# Patient Record
Sex: FEMALE | Race: WHITE | NOT HISPANIC OR LATINO
[De-identification: names, ages, dates, MRNs, and addresses within clinical notes are randomized per-mention and may not be internally consistent; named-entity substitution may affect disease eponyms.]

---

## 2023-01-19 ENCOUNTER — LABORATORY RESULT (OUTPATIENT)
Age: 28
End: 2023-01-19

## 2023-01-19 ENCOUNTER — OUTPATIENT (OUTPATIENT)
Dept: OUTPATIENT SERVICES | Facility: HOSPITAL | Age: 28
LOS: 1 days | End: 2023-01-19

## 2023-01-19 ENCOUNTER — APPOINTMENT (OUTPATIENT)
Dept: INTERNAL MEDICINE | Facility: CLINIC | Age: 28
End: 2023-01-19
Payer: MEDICAID

## 2023-01-19 VITALS
HEART RATE: 94 BPM | DIASTOLIC BLOOD PRESSURE: 74 MMHG | WEIGHT: 117 LBS | SYSTOLIC BLOOD PRESSURE: 115 MMHG | BODY MASS INDEX: 17.73 KG/M2 | OXYGEN SATURATION: 98 % | HEIGHT: 68 IN

## 2023-01-19 DIAGNOSIS — Z78.9 OTHER SPECIFIED HEALTH STATUS: ICD-10-CM

## 2023-01-19 DIAGNOSIS — Z00.00 ENCOUNTER FOR GENERAL ADULT MEDICAL EXAMINATION W/OUT ABNORMAL FINDINGS: ICD-10-CM

## 2023-01-19 DIAGNOSIS — E07.9 DISORDER OF THYROID, UNSPECIFIED: ICD-10-CM

## 2023-01-19 DIAGNOSIS — Z87.891 PERSONAL HISTORY OF NICOTINE DEPENDENCE: ICD-10-CM

## 2023-01-19 DIAGNOSIS — Z80.3 FAMILY HISTORY OF MALIGNANT NEOPLASM OF BREAST: ICD-10-CM

## 2023-01-19 DIAGNOSIS — E04.1 NONTOXIC SINGLE THYROID NODULE: ICD-10-CM

## 2023-01-19 DIAGNOSIS — R22.1 LOCALIZED SWELLING, MASS AND LUMP, NECK: ICD-10-CM

## 2023-01-19 DIAGNOSIS — Z86.19 PERSONAL HISTORY OF OTHER INFECTIOUS AND PARASITIC DISEASES: ICD-10-CM

## 2023-01-19 DIAGNOSIS — Z83.49 FAMILY HISTORY OF OTHER ENDOCRINE, NUTRITIONAL AND METABOLIC DISEASES: ICD-10-CM

## 2023-01-19 DIAGNOSIS — L70.9 ACNE, UNSPECIFIED: ICD-10-CM

## 2023-01-19 DIAGNOSIS — Z83.3 FAMILY HISTORY OF DIABETES MELLITUS: ICD-10-CM

## 2023-01-19 DIAGNOSIS — K92.1 MELENA: ICD-10-CM

## 2023-01-19 DIAGNOSIS — D50.9 IRON DEFICIENCY ANEMIA, UNSPECIFIED: ICD-10-CM

## 2023-01-19 PROCEDURE — 99204 OFFICE O/P NEW MOD 45 MIN: CPT

## 2023-01-19 NOTE — HEALTH RISK ASSESSMENT
[Patient reported PAP Smear was normal] : Patient reported PAP Smear was normal [HIV Test offered] : HIV Test offered [Hepatitis C test offered] : Hepatitis C test offered [Fully functional (bathing, dressing, toileting, transferring, walking, feeding)] : Fully functional (bathing, dressing, toileting, transferring, walking, feeding) [Fully functional (using the telephone, shopping, preparing meals, housekeeping, doing laundry, using] : Fully functional and needs no help or supervision to perform IADLs (using the telephone, shopping, preparing meals, housekeeping, doing laundry, using transportation, managing medications and managing finances) [Former] : Former [0-4] : 0-4 [Yes] : Yes [Monthly or less (1 pt)] : Monthly or less (1 point) [1 or 2 (0 pts)] : 1 or 2 (0 points) [Never (0 pts)] : Never (0 points) [de-identified] : - exercise every day  [de-identified] : Does not eat meat, dairy, eggs- eats fish, whole grain, sweet potato, tofu.  [PapSmearDate] : 11/21 [PapSmearComments] : Reports normal- did HPV co-testing

## 2023-01-19 NOTE — HISTORY OF PRESENT ILLNESS
[FreeTextEntry1] : establish care  [de-identified] : 27  year old F with thyroid disease, thyroid nodule, DEREK, H pylori infx coming in to establish care. \par \par Patient was diagnosed with Hashimoto's disease at age 7. From  [age] yrs old took levothyroxine, then med was stopped. Was restarted last year by an endocrinologist. Pt also reports hx of thyroid nodule- had an US for this but never followed up and does not result.  Denies tremors, sweating, diarrhea/constipation, skin/hair changes\par \par Pt reports having reflux/nausea for the past 2 months. Caffeine/dairy/acidic fruits make it worse. Denies trouble swallowing, sometimes gets epigastric abdominal pain. Had hx of H pylori that was treated when pt was 19 but never had confirmed eradication. Pt reports having endoscopy that showed inflammation- denies hx of ulcer or premalignant changes. Was told she did not need any repeat testing. Pt denies cough, trouble swallowing or pain when swallowing. \par \par Pt endorses- having episodes of blood around the stool-  not sure if intermixed w stool. Happens sometimes. Pt denies constipation or hx of hemorrhoids.Bleeding is on and off. No rectal pain or discomfort. Happened for years. Denies melena. Has had intentional weight loss after her divorce- 15lbs over 2 yrs. Always thin, always had hard time gaining wt. Denies fevers/chills/ night sweats. \par \par Patient reports hx if DEREK- periods last 5-6 days. Heaviest on 1st to 2nd day- Uses 4 super tampons on the heaviest days. Then periods taper off. Pt reports having worse periods with ParaGard. Not using birth control. Patient sexually active- use condoms.\par \par \par Patient is endorsing swollen neck lymph nodes R side>L. Under chin and right below the chin. Noticed them this week. Pt had COVID a month ago. No recent infection but does complain of mild sore throat the morning and some post nasal drip. \par \par Pt reports getting acne- it is on and off. Interested in following with a dermatologist.

## 2023-01-19 NOTE — PLAN
[FreeTextEntry1] : \par 27  year old F with thyroid disease, thyroid nodule, DEREK, H pylori infx coming in to establish care. \par \par #hx of DEREK\par - will obtain CBC with iron studies, B12, folate, ttg\par \par # hematochezia/ unintentional wt loss/ hx of H pylori infx/Reflux\par - discussed reflux diet \par - could be in the setting of hemorrhoids, but pt denies constipation and endorses unintentional wt loss \par - will refer to GI\par - lab as above, get H pylori stool antigen \par \par #thyroid nodule/ small neck nodule\par - will obtain neck and thyroid US\par \par #thyroid disease\par - will obtain TSH, FT4, T3, TPO ab\par - on levothyroxine 50cmg\par \par #sore throat/ postnasal drip\par -discussed conservative measures, can try getting humidifier, steam baths\par -not interested in tx for this at this time \par \par #acne\par -derm referral\par \par #HCM\par -labs as above, STI testing\par -decline immunizations \par - reports pap done in 2021, interested in GYN consultation\par -dental referral \par -advised to provide medical records\par \par f/u in 3 mo

## 2023-01-19 NOTE — PHYSICAL EXAM
[No Acute Distress] : no acute distress [Normal Oropharynx] : the oropharynx was normal [Supple] : supple [Thyroid Normal, No Nodules] : the thyroid was normal and there were no nodules present [No Respiratory Distress] : no respiratory distress  [No Accessory Muscle Use] : no accessory muscle use [Clear to Auscultation] : lungs were clear to auscultation bilaterally [Normal Rate] : normal rate  [Regular Rhythm] : with a regular rhythm [Normal S1, S2] : normal S1 and S2 [No Edema] : there was no peripheral edema [Soft] : abdomen soft [Non Tender] : non-tender [Non-distended] : non-distended [Normal Bowel Sounds] : normal bowel sounds [No Spinal Tenderness] : no spinal tenderness [Grossly Normal Strength/Tone] : grossly normal strength/tone [No Focal Deficits] : no focal deficits [Normal Affect] : the affect was normal [Normal Insight/Judgement] : insight and judgment were intact [Normal Sphincter Tone] : normal sphincter tone [de-identified] : Has small mobile rounded mass on R side of neck below chin. Non tender, cannot appreciate other masses on exam  [FreeTextEntry1] : has evidence of external hemorrhoids on exam, no internal hemorroid. No blood appreciated.

## 2023-01-20 ENCOUNTER — NON-APPOINTMENT (OUTPATIENT)
Age: 28
End: 2023-01-20

## 2023-01-20 DIAGNOSIS — Z00.00 ENCOUNTER FOR GENERAL ADULT MEDICAL EXAMINATION WITHOUT ABNORMAL FINDINGS: ICD-10-CM

## 2023-01-20 DIAGNOSIS — L70.9 ACNE, UNSPECIFIED: ICD-10-CM

## 2023-01-20 DIAGNOSIS — D50.9 IRON DEFICIENCY ANEMIA, UNSPECIFIED: ICD-10-CM

## 2023-01-20 DIAGNOSIS — Z86.19 PERSONAL HISTORY OF OTHER INFECTIOUS AND PARASITIC DISEASES: ICD-10-CM

## 2023-01-20 DIAGNOSIS — E07.9 DISORDER OF THYROID, UNSPECIFIED: ICD-10-CM

## 2023-01-20 DIAGNOSIS — R22.1 LOCALIZED SWELLING, MASS AND LUMP, NECK: ICD-10-CM

## 2023-01-20 DIAGNOSIS — K92.1 MELENA: ICD-10-CM

## 2023-01-20 DIAGNOSIS — E04.1 NONTOXIC SINGLE THYROID NODULE: ICD-10-CM

## 2023-01-20 LAB
25(OH)D3 SERPL-MCNC: 43.1 NG/ML
ALBUMIN SERPL ELPH-MCNC: 5.3 G/DL
ALP BLD-CCNC: 58 U/L
ALT SERPL-CCNC: 12 U/L
ANION GAP SERPL CALC-SCNC: 11 MMOL/L
AST SERPL-CCNC: 19 U/L
BASOPHILS # BLD AUTO: 0.07 K/UL
BASOPHILS NFR BLD AUTO: 1.7 %
BILIRUB SERPL-MCNC: 0.3 MG/DL
BUN SERPL-MCNC: 7 MG/DL
C TRACH RRNA SPEC QL NAA+PROBE: NOT DETECTED
CALCIUM SERPL-MCNC: 9.5 MG/DL
CHLORIDE SERPL-SCNC: 101 MMOL/L
CHOLEST SERPL-MCNC: 216 MG/DL
CO2 SERPL-SCNC: 28 MMOL/L
CREAT SERPL-MCNC: 0.58 MG/DL
EGFR: 126 ML/MIN/1.73M2
EOSINOPHIL # BLD AUTO: 0.1 K/UL
EOSINOPHIL NFR BLD AUTO: 2.4 %
ESTIMATED AVERAGE GLUCOSE: 100 MG/DL
FERRITIN SERPL-MCNC: 11 NG/ML
FOLATE SERPL-MCNC: 9.9 NG/ML
GLUCOSE SERPL-MCNC: 100 MG/DL
HBA1C MFR BLD HPLC: 5.1 %
HBV CORE IGG+IGM SER QL: NONREACTIVE
HBV SURFACE AB SER QL: REACTIVE
HBV SURFACE AG SER QL: NONREACTIVE
HCT VFR BLD CALC: 39.8 %
HCV AB SER QL: NONREACTIVE
HCV S/CO RATIO: 0.1 S/CO
HDLC SERPL-MCNC: 83 MG/DL
HGB BLD-MCNC: 12.7 G/DL
IMM GRANULOCYTES NFR BLD AUTO: 0.2 %
IRON SATN MFR SERPL: 16 %
IRON SERPL-MCNC: 71 UG/DL
LDLC SERPL CALC-MCNC: 117 MG/DL
LYMPHOCYTES # BLD AUTO: 1.09 K/UL
LYMPHOCYTES NFR BLD AUTO: 26.1 %
MAN DIFF?: NORMAL
MCHC RBC-ENTMCNC: 29.5 PG
MCHC RBC-ENTMCNC: 31.9 GM/DL
MCV RBC AUTO: 92.3 FL
MONOCYTES # BLD AUTO: 0.31 K/UL
MONOCYTES NFR BLD AUTO: 7.4 %
N GONORRHOEA RRNA SPEC QL NAA+PROBE: NOT DETECTED
NEUTROPHILS # BLD AUTO: 2.59 K/UL
NEUTROPHILS NFR BLD AUTO: 62.2 %
NONHDLC SERPL-MCNC: 133 MG/DL
PLATELET # BLD AUTO: 295 K/UL
POTASSIUM SERPL-SCNC: 4.2 MMOL/L
PROT SERPL-MCNC: 8 G/DL
RBC # BLD: 4.31 M/UL
RBC # BLD: 4.31 M/UL
RBC # FLD: 12.5 %
RETICS # AUTO: 0.9 %
RETICS AGGREG/RBC NFR: 39.2 K/UL
SODIUM SERPL-SCNC: 140 MMOL/L
SOURCE AMPLIFICATION: NORMAL
T3 SERPL-MCNC: 85 NG/DL
T4 FREE SERPL-MCNC: 1.3 NG/DL
THYROPEROXIDASE AB SERPL IA-ACNC: 39.9 IU/ML
TIBC SERPL-MCNC: 452 UG/DL
TRANSFERRIN SERPL-MCNC: 375 MG/DL
TRIGL SERPL-MCNC: 79 MG/DL
TSH SERPL-ACNC: 1.73 UIU/ML
UIBC SERPL-MCNC: 381 UG/DL
VIT B12 SERPL-MCNC: 711 PG/ML
WBC # FLD AUTO: 4.17 K/UL

## 2023-01-22 LAB
HIV1+2 AB SPEC QL IA.RAPID: NONREACTIVE
TTG IGA SER IA-ACNC: <1.2 U/ML
TTG IGA SER-ACNC: NEGATIVE
TTG IGG SER IA-ACNC: <1.2 U/ML
TTG IGG SER IA-ACNC: NEGATIVE

## 2023-03-03 RX ORDER — LEVOTHYROXINE SODIUM 0.05 MG/1
50 TABLET ORAL
Qty: 1 | Refills: 1 | Status: ACTIVE | COMMUNITY
Start: 2023-01-19 | End: 1900-01-01

## 2023-03-05 DIAGNOSIS — A04.8 OTHER SPECIFIED BACTERIAL INTESTINAL INFECTIONS: ICD-10-CM

## 2023-03-05 LAB — H PYLORI AG STL QL: POSITIVE

## 2023-03-05 RX ORDER — METRONIDAZOLE 250 MG/1
250 TABLET ORAL
Qty: 56 | Refills: 0 | Status: ACTIVE | COMMUNITY
Start: 2023-03-05 | End: 1900-01-01

## 2023-03-05 RX ORDER — BISMUTH SUBSALICYLATE 262 MG/1
262 TABLET, CHEWABLE ORAL 4 TIMES DAILY
Qty: 56 | Refills: 0 | Status: ACTIVE | COMMUNITY
Start: 2023-03-05 | End: 1900-01-01

## 2023-03-05 RX ORDER — PANTOPRAZOLE 40 MG/1
40 TABLET, DELAYED RELEASE ORAL
Qty: 28 | Refills: 0 | Status: ACTIVE | COMMUNITY
Start: 2023-03-05 | End: 1900-01-01

## 2023-03-05 RX ORDER — TETRACYCLINE HYDROCHLORIDE 500 MG/1
500 CAPSULE ORAL
Qty: 56 | Refills: 0 | Status: ACTIVE | COMMUNITY
Start: 2023-03-05 | End: 1900-01-01

## 2023-04-19 ENCOUNTER — APPOINTMENT (OUTPATIENT)
Dept: INTERNAL MEDICINE | Facility: CLINIC | Age: 28
End: 2023-04-19

## 2023-05-11 ENCOUNTER — APPOINTMENT (OUTPATIENT)
Dept: GASTROENTEROLOGY | Facility: CLINIC | Age: 28
End: 2023-05-11